# Patient Record
Sex: MALE | Race: WHITE | NOT HISPANIC OR LATINO | Employment: OTHER | URBAN - METROPOLITAN AREA
[De-identification: names, ages, dates, MRNs, and addresses within clinical notes are randomized per-mention and may not be internally consistent; named-entity substitution may affect disease eponyms.]

---

## 2019-05-10 ENCOUNTER — HOSPITAL ENCOUNTER (EMERGENCY)
Facility: HOSPITAL | Age: 34
Discharge: HOME | End: 2019-05-10
Attending: EMERGENCY MEDICINE
Payer: MEDICAID

## 2019-05-10 VITALS
DIASTOLIC BLOOD PRESSURE: 68 MMHG | TEMPERATURE: 99 F | SYSTOLIC BLOOD PRESSURE: 135 MMHG | OXYGEN SATURATION: 98 % | RESPIRATION RATE: 12 BRPM | WEIGHT: 180 LBS | HEART RATE: 73 BPM | BODY MASS INDEX: 24.38 KG/M2 | HEIGHT: 72 IN

## 2019-05-10 DIAGNOSIS — R05.9 COUGH: ICD-10-CM

## 2019-05-10 DIAGNOSIS — J20.8 VIRAL BRONCHITIS: Primary | ICD-10-CM

## 2019-05-10 LAB
INFLUENZA A, MOLECULAR: NEGATIVE
INFLUENZA B, MOLECULAR: NEGATIVE
SPECIMEN SOURCE: NORMAL

## 2019-05-10 PROCEDURE — 87502 INFLUENZA DNA AMP PROBE: CPT

## 2019-05-10 PROCEDURE — 99283 EMERGENCY DEPT VISIT LOW MDM: CPT | Mod: 25

## 2019-05-10 RX ORDER — BUPRENORPHINE HYDROCHLORIDE AND NALOXONE HYDROCHLORIDE DIHYDRATE 2; .5 MG/1; MG/1
TABLET SUBLINGUAL EVERY 6 HOURS PRN
COMMUNITY

## 2019-05-10 RX ORDER — GUAIFENESIN AND DEXTROMETHORPHAN HYDROBROMIDE 1200; 60 MG/1; MG/1
1 TABLET, EXTENDED RELEASE ORAL 2 TIMES DAILY
Qty: 20 TABLET | Refills: 0 | Status: SHIPPED | OUTPATIENT
Start: 2019-05-10 | End: 2019-05-20

## 2019-05-10 RX ORDER — GUAIFENESIN AND DEXTROMETHORPHAN HYDROBROMIDE 1200; 60 MG/1; MG/1
1 TABLET, EXTENDED RELEASE ORAL 2 TIMES DAILY
Qty: 20 TABLET | Refills: 0 | Status: SHIPPED | OUTPATIENT
Start: 2019-05-10 | End: 2019-05-10 | Stop reason: SDUPTHER

## 2019-05-10 NOTE — ED PROVIDER NOTES
Encounter Date: 5/10/2019    SCRIBE #1 NOTE: Pearl FARMER, miller scribing for, and in the presence of, Magi Ramirez NP.       History     Chief Complaint   Patient presents with    Cough     x 4 days with fever       Time seen by provider: 5:54 PM on 05/10/2019    Quentin Campbell is a 33 y.o. male who presents to the ED complaining of cough x 3 days with associated runny nose, nasal congestion with green mucous, sore throat, and fever. The patient denies nausea, vomiting, diarrhea, or any other symptoms at this time. No documented PMHx or SHx noted. No known drug allergies noted.        Review of patient's allergies indicates:  No Known Allergies  History reviewed. No pertinent past medical history.  History reviewed. No pertinent surgical history.  History reviewed. No pertinent family history.  Social History     Tobacco Use    Smoking status: Never Smoker   Substance Use Topics    Alcohol use: No    Drug use: No     Review of Systems   Constitutional: Positive for fever.   HENT: Positive for congestion, rhinorrhea and sore throat. Negative for facial swelling, sinus pressure, sinus pain and voice change.    Respiratory: Positive for cough. Negative for shortness of breath.    Cardiovascular: Negative for chest pain.   Gastrointestinal: Negative for diarrhea, nausea and vomiting.   Genitourinary: Negative for dysuria.   Musculoskeletal: Negative for back pain, neck pain and neck stiffness.   Skin: Negative for rash.   Neurological: Negative for weakness.   Hematological: Does not bruise/bleed easily.       Physical Exam     Initial Vitals [05/10/19 1654]   BP Pulse Resp Temp SpO2   135/68 73 12 98.8 °F (37.1 °C) 98 %      MAP       --         Physical Exam    Nursing note and vitals reviewed.  Constitutional: Vital signs are normal. He appears well-developed and well-nourished.   HENT:   Head: Normocephalic and atraumatic.   Nose: No rhinorrhea or sinus tenderness. Right sinus exhibits no maxillary sinus  tenderness and no frontal sinus tenderness. Left sinus exhibits no maxillary sinus tenderness and no frontal sinus tenderness.   Mouth/Throat: Uvula is midline, oropharynx is clear and moist and mucous membranes are normal.   Eyes: Conjunctivae are normal. Pupils are equal, round, and reactive to light.   Neck: Neck supple.   Cardiovascular: Normal rate, regular rhythm, normal heart sounds and intact distal pulses. Exam reveals no gallop and no friction rub.    No murmur heard.  Pulmonary/Chest: Effort normal and breath sounds normal. He has no wheezes. He has no rhonchi. He has no rales.   Abdominal: Soft. Normal appearance and bowel sounds are normal. There is no tenderness.   Neurological: He is alert and oriented to person, place, and time. He has normal strength.   Skin: Skin is warm, dry and intact.   Psychiatric: He has a normal mood and affect. His speech is normal and behavior is normal.         ED Course   Procedures  Labs Reviewed   INFLUENZA A & B BY MOLECULAR          Imaging Results          X-Ray Chest PA And Lateral (Final result)  Result time 05/10/19 17:25:48    Final result by Tammy Tang MD (05/10/19 17:25:48)                 Impression:      No acute cardiopulmonary disease.      Electronically signed by: Tammy Tang MD  Date:    05/10/2019  Time:    17:25             Narrative:    EXAMINATION:  XR CHEST PA AND LATERAL    CLINICAL HISTORY:  Cough    TECHNIQUE:  PA and lateral views of the chest were performed.    COMPARISON:  6/29/2016    FINDINGS:  The heart is not enlarged.  The lungs are clear of infiltrate.  There is no pleural effusion.                                 Medical Decision Making:   History:   Old Medical Records: I decided to obtain old medical records.  Differential Diagnosis:   Bronchitis  Pneumonia  influenza  Clinical Tests:   Lab Tests: Ordered and Reviewed  Radiological Study: Ordered and Reviewed       APC / Resident Notes:   Patient is a 33 y.o. male who  presents to the ED 05/10/2019 who underwent emergent evaluation for cough for 3 days with associated fever.  He is afebrile here with normal vital signs. He is in no acute respiratory distress. He does not appear septic or toxic.  He does not appear acutely dehydrated.  He is tolerating p.o..  Patient also complains of sore throat as well as rhinorrhea.  He has no sinus tenderness.  Do not think acute bacterial sinusitis.  He has normal phonation.  Posterior oropharynx without erythema or exudate.  Uvula midline. No cervical adenopathy.  I do not think strep pharyngitis.  Influenza testing is negative. I do not think influenza.  Patient likely suffering from a viral bronchitis.  Chest x-ray without acute findings.  I do not think pneumonia or bacterial bronchitis.  Patient instructed to follow up with PCP.  Discussed supportive care with over-the-counter medications.  Patient verbalized understanding. Based on my clinical evaluation, I do not appreciate any immediate, emergent, or life threatening condition or etiology that warrants additional workup today and feel that the patient can be discharged with close follow up care. Case discussed with Dr. Amado who is agreeable to plan of care. Follow up and return precautions discussed; patient verbalized understanding and is agreeable to plan of care. Patient discharged home in stable condition.             Scribe Attestation:   Scribe #1: I performed the above scribed service and the documentation accurately describes the services I performed. I attest to the accuracy of the note.    Attending Attestation:           Physician Attestation for Scribe:  Physician Attestation Statement for Scribe #1: I, Magi Ramirez, reviewed documentation, as scribed by in my presence, and it is both accurate and complete.     Comments: I, Magi Ramirez, NP-C, personally performed the services described in this documentation. All medical record entries made by the scribe were at my  direction and in my presence.  I have reviewed the chart and agree that the record reflects my personal performance and is accurate and complete. ANTONIO Dobson.  9:42 PM 05/10/2019                Clinical Impression:       ICD-10-CM ICD-9-CM   1. Viral bronchitis J20.8 466.0   2. Cough R05 786.2         Disposition:   Disposition: Discharged  Condition: Stable                        Magi Ramirez NP  05/10/19 2147

## 2019-05-10 NOTE — PROGRESS NOTES
05/10/13010:54 PM  I have evaluated and performed a medical screening assessment on this patient while awaiting a thorough evaluation and treatment. All of the emergency department beds are occupied at this time. When appropriate, laboratory studies will be ordered from triage. The patient has been advised of this process and care plan. Patient complains of cough for 4 days and fever. No PMH. No medications.     Orders pending: CXR and influenza.   Disposition: stable